# Patient Record
Sex: FEMALE | Race: WHITE | NOT HISPANIC OR LATINO | URBAN - METROPOLITAN AREA
[De-identification: names, ages, dates, MRNs, and addresses within clinical notes are randomized per-mention and may not be internally consistent; named-entity substitution may affect disease eponyms.]

---

## 2017-01-03 ENCOUNTER — OUTPATIENT (OUTPATIENT)
Dept: OUTPATIENT SERVICES | Facility: HOSPITAL | Age: 37
LOS: 1 days | End: 2017-01-03
Payer: COMMERCIAL

## 2017-01-03 PROCEDURE — 85025 COMPLETE CBC W/AUTO DIFF WBC: CPT

## 2017-01-03 PROCEDURE — 84703 CHORIONIC GONADOTROPIN ASSAY: CPT

## 2017-01-03 PROCEDURE — G0463: CPT

## 2017-01-03 PROCEDURE — 36415 COLL VENOUS BLD VENIPUNCTURE: CPT

## 2017-01-13 ENCOUNTER — OUTPATIENT (OUTPATIENT)
Dept: OUTPATIENT SERVICES | Facility: HOSPITAL | Age: 37
LOS: 1 days | Discharge: HOME | End: 2017-01-13
Payer: COMMERCIAL

## 2017-01-13 PROCEDURE — 88305 TISSUE EXAM BY PATHOLOGIST: CPT | Mod: 26

## 2017-01-13 PROCEDURE — 19285 PERQ DEV BREAST 1ST US IMAG: CPT | Mod: RT

## 2017-01-13 PROCEDURE — 76098 X-RAY EXAM SURGICAL SPECIMEN: CPT | Mod: 26

## 2017-01-13 PROCEDURE — 88307 TISSUE EXAM BY PATHOLOGIST: CPT | Mod: 26

## 2017-01-14 PROCEDURE — 88307 TISSUE EXAM BY PATHOLOGIST: CPT

## 2017-01-14 PROCEDURE — 76098 X-RAY EXAM SURGICAL SPECIMEN: CPT

## 2017-01-14 PROCEDURE — 19125 EXCISION BREAST LESION: CPT | Mod: RT

## 2017-01-14 PROCEDURE — 19285 PERQ DEV BREAST 1ST US IMAG: CPT

## 2017-01-14 PROCEDURE — 19120 REMOVAL OF BREAST LESION: CPT | Mod: RT,XU

## 2017-01-14 PROCEDURE — 88305 TISSUE EXAM BY PATHOLOGIST: CPT

## 2024-07-23 ENCOUNTER — APPOINTMENT (OUTPATIENT)
Dept: PAIN MANAGEMENT | Facility: CLINIC | Age: 44
End: 2024-07-23

## 2024-07-23 DIAGNOSIS — M79.18 MYALGIA, OTHER SITE: ICD-10-CM

## 2024-07-23 DIAGNOSIS — T83.9XXA UNSPECIFIED COMPLICATION OF GENITOURINARY PROSTHETIC DEVICE, IMPLANT AND GRAFT, INITIAL ENCOUNTER: ICD-10-CM

## 2024-07-23 PROBLEM — Z00.00 ENCOUNTER FOR PREVENTIVE HEALTH EXAMINATION: Status: ACTIVE | Noted: 2024-07-23

## 2024-07-23 PROCEDURE — 99203 OFFICE O/P NEW LOW 30 MIN: CPT

## 2024-07-23 RX ORDER — SPIRONOLACTONE 50 MG/1
TABLET ORAL
Refills: 0 | Status: ACTIVE | COMMUNITY

## 2024-07-23 RX ORDER — ESCITALOPRAM OXALATE 5 MG/1
TABLET, FILM COATED ORAL
Refills: 0 | Status: ACTIVE | COMMUNITY

## 2024-07-23 NOTE — HISTORY OF PRESENT ILLNESS
[FreeTextEntry1] : THIS PLEASANT PATIENT IS 44 year OLD  female  WHO PRESENT TODAY IN OFFICE WITH HIP PAIN RADATINF DWN RT HIP TO MID THIGH, WOULD LIKE TO DISS PLAN OF CARE FOR PAIN   DATE OF INJURY/ONSET  FOR THAT 2 WEEKS THAT HAS BEEN PROGRESSING TO WORSE      PAIN LEVEL:  7/10-   MECHANISM OF INJURY: UNKOWN INJURAY     QUALITY OF SYMPTOMS:  STRAING, PRESSURE, DULL/ACH, STIFNESS      IMPROVES WITH:  ACETPH 200 MG EXTRA      WORSE WITH:  SITTING TO STANDING      PRIOR TREATMENT: AS OF 2 WEEKS AGO PT PT IS PHYSCIALLY ACTICVE   PATIENT IS PRESENTING WITH ACUTE/SUB-ACUTE RADICULAR PAIN WITH IMPAIRMENT IN ADLs AND FUNCTIONALITY. THE PATIENT HAS NOT RESPONDED TO CONSERVATIVE CARE INCLUDING NSAID THERAPY AND/OR PHYSICAL THERAPY 2-3X A WEEK FOR +6 WEEK. AS PER MD CT IS NOT THE BEST WAY TO BEGIN TREATMENT AFTER CODY COMPLETE WILL USE TO JUMP START TO RETURN TO ADL            PRIOR IMAGING:  NO IMAGE HAS BEEN COMPLETE WILL NEED ONE TO BETTER ADILUCATE THE PROMBLEM        SCHOOL/SPORT/POSITION/OCCUPATION:       ADDITIONAL INFORMATION

## 2024-07-23 NOTE — ASSESSMENT
[FreeTextEntry1] : History This 44-year-old presents to us with pain on the lateral aspect of the left thigh with no radiating component.  She relates possibly to an exercise that she was performing in the gym that was the onset of this pain.  She has had bursitis of the hip in the past and feels that this is significantly different than that.  She says that since that time she has had to decrease her activity level.  The patient denies any bowel or bladder incontinence any progressive weakness or any radiation of the pain in any other direction.  The patient specifically denies any radiation of pain into the groin nor past the knee.  The patient has slowed down her activity level but that has been ineffective in treating this discomfort. Objective There were no imaging studies available.  Patient has mild point tenderness over the lateral aspect of the thigh.  Patient has normal range of motion of the left lower extremity.  Patient ambulates without an assistive device. Assessment IT strain versus myofascial pain. Plan Patient be started on 800 mg of Motrin 3 times daily and be sent for physical therapy for further evaluation.  If this is ineffective in treating the pain patient will return to the office for imaging studies.

## 2024-07-24 ENCOUNTER — TRANSCRIPTION ENCOUNTER (OUTPATIENT)
Age: 44
End: 2024-07-24

## 2024-07-24 RX ORDER — IBUPROFEN 800 MG/1
800 TABLET, FILM COATED ORAL
Qty: 90 | Refills: 5 | Status: ACTIVE | COMMUNITY
Start: 2024-07-23 | End: 1900-01-01

## 2024-09-26 ENCOUNTER — NON-APPOINTMENT (OUTPATIENT)
Age: 44
End: 2024-09-26

## 2024-12-23 ENCOUNTER — NON-APPOINTMENT (OUTPATIENT)
Age: 44
End: 2024-12-23